# Patient Record
Sex: MALE | Race: BLACK OR AFRICAN AMERICAN | NOT HISPANIC OR LATINO | Employment: FULL TIME | URBAN - METROPOLITAN AREA
[De-identification: names, ages, dates, MRNs, and addresses within clinical notes are randomized per-mention and may not be internally consistent; named-entity substitution may affect disease eponyms.]

---

## 2017-03-14 ENCOUNTER — NON-PROVIDER VISIT (OUTPATIENT)
Dept: OCCUPATIONAL MEDICINE | Facility: CLINIC | Age: 62
End: 2017-03-14

## 2017-03-14 DIAGNOSIS — Z29.89 NEED FOR ISOLATION: ICD-10-CM

## 2017-03-14 PROCEDURE — 99202 OFFICE O/P NEW SF 15 MIN: CPT | Performed by: PREVENTIVE MEDICINE

## 2017-03-14 PROCEDURE — 94375 RESPIRATORY FLOW VOLUME LOOP: CPT | Performed by: PREVENTIVE MEDICINE

## 2017-03-14 NOTE — MR AVS SNAPSHOT
Jacinto Parker   3/14/2017 2:40 PM   Non-Provider Visit   MRN: [id number]    Department:  [facility]   Dept Phone:  [phone]    Description:  Male : 1955   Provider:  [facility] ESPERANZA NOGUEIRA, R.N.           Reason for Visit     Other Thermofisher Mask fit and Respiratory Clearance       Allergies as of 3/14/2017     Allergen Noted Reactions    Asa [Aspirin] 2011       Pcn [Penicillins] 2011         Vital Signs     Smoking Status                   Former Smoker           Basic Information     Date Of Birth Sex Race Ethnicity Preferred Language    1955 Male Black or  Non- English      Problem List              ICD-10-CM Priority Class Noted - Resolved    Non-ischemic cardiomyopathy (CMS-HCC) I42.9   2011 - Present    HTN (hypertension) I10   2011 - Present    Hypercholesteremia E78.00   2011 - Present      Health Maintenance        Date Due Completion Dates    IMM DTaP/Tdap/Td Vaccine (1 - Tdap) 1973 ---    IMM PNEUMOCOCCAL 19-64 (ADULT) MEDIUM RISK SERIES (1 of 1 - PPSV23) 1973 ---    COLONOSCOPY 2004 ---    IMM ZOSTER VACCINE 2014 ---    IMM INFLUENZA (1) 2016 ---            Current Immunizations     No immunizations on file.      Below and/or attached are the medications your provider expects you to take. Review all of your home medications and newly ordered medications with your provider and/or pharmacist. Follow medication instructions as directed by your provider and/or pharmacist. Please keep your medication list with you and share with your provider. Update the information when medications are discontinued, doses are changed, or new medications (including over-the-counter products) are added; and carry medication information at all times in the event of emergency situations     Allergies:  ASA - (reactions not documented)     PCN - (reactions not documented)               Medications  Valid as of: March  14, 2017 -  3:17 PM    Generic Name Brand Name Tablet Size Instructions for use    Carvedilol (Tab) COREG 25 MG TAKE ONE TABLET BY MOUTH EVERY DAY IN THE MORNING AND TWO TABLETS EVERY DAY IN THE EVENING        Enalapril Maleate (Tab) VASOTEC 10 MG Take 1 Tab by mouth 2 times a day.        Nitroglycerin (SL Tab) NITROSTAT 0.4 MG Place 1 Tab under tongue as needed for Chest Pain.        Simvastatin (Tab) ZOCOR 20 MG Take 1 Tab by mouth every evening.        Spironolactone (Tab) ALDACTONE 25 MG Take 1 Tab by mouth every day.        Tadalafil (Tab) CIALIS 20 MG Take 1 Tab by mouth as needed.        .                 Medicines prescribed today were sent to:     [facility] [address] [place] [address]    Phone: [phone] Fax: [phone]    Open 24 Hours?: No      Medication refill instructions:       If your prescription bottle indicates you have medication refills left, it is not necessary to call your provider’s office. Please contact your pharmacy and they will refill your medication.    If your prescription bottle indicates you do not have any refills left, you may request refills at any time through one of the following ways: The online [employer] system (except Urgent Care), by calling your provider’s office, or by asking your pharmacy to contact your provider’s office with a refill request. Medication refills are processed only during regular business hours and may not be available until the next business day. Your provider may request additional information or to have a follow-up visit with you prior to refilling your medication.   *Please Note: Medication refills are assigned a new Rx number when refilled electronically. Your pharmacy may indicate that no refills were authorized even though a new prescription for the same medication is available at the pharmacy. Please request the medicine by name with the pharmacy before contacting your provider  for a refill.           Procured Health Access Code: [email or web address]  Expires: 4/13/2017  3:17 PM    Procured Health  A secure, online tool to manage your health information     [employer]’s Procured Health® is a secure, online tool that connects you to your personalized health information from the privacy of your home -- day or night - making it very easy for you to manage your healthcare. Once the activation process is completed, you can even access your medical information using the Procured Health eri, which is available for free in the Apple Eri store or Google Play store.     Procured Health provides the following levels of access (as shown below):   My Chart Features   [facility] Primary Care Doctor [facility]  Specialists [facility]  Urgent  Care Non-[facility]  Primary Care  Doctor   Email your healthcare team securely and privately 24/7 X X X    Manage appointments: schedule your next appointment; view details of past/upcoming appointments X      Request prescription refills. X      View recent personal medical records, including lab and immunizations X X X X   View health record, including health history, allergies, medications X X X X   Read reports about your outpatient visits, procedures, consult and ER notes X X X X   See your discharge summary, which is a recap of your hospital and/or ER visit that includes your diagnosis, lab results, and care plan. X X       How to register for Procured Health:  1. Go to  [email or web address].  2. Click on the Sign Up Now box, which takes you to the New Member Sign Up page. You will need to provide the following information:  a. Enter your Procured Health Access Code exactly as it appears at the top of this page. (You will not need to use this code after you’ve completed the sign-up process. If you do not sign up before the expiration date, you must request a new code.)   b. Enter your date of birth.   c. Enter your home email address.   d. Click Submit, and follow the next screen’s instructions.  3. Create a Procured Health ID.  This will be your [employer] login ID and cannot be changed, so think of one that is secure and easy to remember.  4. Create a [employer] password. You can change your password at any time.  5. Enter your Password Reset Question and Answer. This can be used at a later time if you forget your password.   6. Enter your e-mail address. This allows you to receive e-mail notifications when new information is available in [employer].  7. Click Sign Up. You can now view your health information.    For assistance activating your [employer] account, call [phone]

## 2017-04-14 ENCOUNTER — NON-PROVIDER VISIT (OUTPATIENT)
Dept: OCCUPATIONAL MEDICINE | Facility: CLINIC | Age: 62
End: 2017-04-14

## 2017-04-14 DIAGNOSIS — Z01.89 RESPIRATORY CLEARANCE EXAMINATION, ENCOUNTER FOR: ICD-10-CM

## 2017-04-14 PROCEDURE — 99202 OFFICE O/P NEW SF 15 MIN: CPT | Performed by: PREVENTIVE MEDICINE

## 2017-04-14 NOTE — ADDENDUM NOTE
Addended by: ELDON PFEIFFER on: 4/14/2017 11:20 AM     Modules accepted: Level of Service, SmartSet

## 2017-04-14 NOTE — MR AVS SNAPSHOT
Jacinto Parker   2017 3:20 PM   Non-Provider Visit   MRN: [id number]    Department:  [facility]   Dept Phone:  [phone]    Description:  Male : 1955   Provider:  Roni Méndez D.O.           Reason for Visit     Other Resp clearance      Allergies as of 2017     Allergen Noted Reactions    Asa [Aspirin] 2011       Pcn [Penicillins] 2011         You were diagnosed with     Respiratory clearance examination, encounter for   [[id number]]         Vital Signs     Smoking Status                   Former Smoker           Basic Information     Date Of Birth Sex Race Ethnicity Preferred Language    1955 Male Black or  Non- English      Problem List              ICD-10-CM Priority Class Noted - Resolved    Non-ischemic cardiomyopathy (CMS-HCC) I42.9   2011 - Present    HTN (hypertension) I10   2011 - Present    Hypercholesteremia E78.00   2011 - Present      Health Maintenance        Date Due Completion Dates    IMM DTaP/Tdap/Td Vaccine (1 - Tdap) 1973 ---    IMM PNEUMOCOCCAL 19-64 (ADULT) MEDIUM RISK SERIES (1 of 1 - PPSV23) 1973 ---    COLONOSCOPY 2004 ---    IMM ZOSTER VACCINE 2014 ---            Current Immunizations     No immunizations on file.      Below and/or attached are the medications your provider expects you to take. Review all of your home medications and newly ordered medications with your provider and/or pharmacist. Follow medication instructions as directed by your provider and/or pharmacist. Please keep your medication list with you and share with your provider. Update the information when medications are discontinued, doses are changed, or new medications (including over-the-counter products) are added; and carry medication information at all times in the event of emergency situations     Allergies:  ASA - (reactions not documented)     PCN - (reactions not documented)               Medications   Valid as of: April 14, 2017 -  5:52 PM    Generic Name Brand Name Tablet Size Instructions for use    Carvedilol (Tab) COREG 25 MG TAKE ONE TABLET BY MOUTH EVERY DAY IN THE MORNING AND TWO TABLETS EVERY DAY IN THE EVENING        Enalapril Maleate (Tab) VASOTEC 10 MG Take 1 Tab by mouth 2 times a day.        Nitroglycerin (SL Tab) NITROSTAT 0.4 MG Place 1 Tab under tongue as needed for Chest Pain.        Simvastatin (Tab) ZOCOR 20 MG Take 1 Tab by mouth every evening.        Spironolactone (Tab) ALDACTONE 25 MG Take 1 Tab by mouth every day.        Tadalafil (Tab) CIALIS 20 MG Take 1 Tab by mouth as needed.        .                 Medicines prescribed today were sent to:     [facility] [address] [place] [address]    Phone: [phone] Fax: [phone]    Open 24 Hours?: No      Medication refill instructions:       If your prescription bottle indicates you have medication refills left, it is not necessary to call your provider’s office. Please contact your pharmacy and they will refill your medication.    If your prescription bottle indicates you do not have any refills left, you may request refills at any time through one of the following ways: The online [employer] system (except Urgent Care), by calling your provider’s office, or by asking your pharmacy to contact your provider’s office with a refill request. Medication refills are processed only during regular business hours and may not be available until the next business day. Your provider may request additional information or to have a follow-up visit with you prior to refilling your medication.   *Please Note: Medication refills are assigned a new Rx number when refilled electronically. Your pharmacy may indicate that no refills were authorized even though a new prescription for the same medication is available at the pharmacy. Please request the medicine by name with the pharmacy before  contacting your provider for a refill.           MapMyFitness Access Code: [email or web address]  Expires: 5/14/2017  5:52 PM    MapMyFitness  A secure, online tool to manage your health information     [employer]’s MapMyFitness® is a secure, online tool that connects you to your personalized health information from the privacy of your home -- day or night - making it very easy for you to manage your healthcare. Once the activation process is completed, you can even access your medical information using the MapMyFitness eri, which is available for free in the Apple Eri store or Google Play store.     MapMyFitness provides the following levels of access (as shown below):   My Chart Features   [facility] Primary Care Doctor [facility]  Specialists [facility]  Urgent  Care Non-[facility]  Primary Care  Doctor   Email your healthcare team securely and privately 24/7 X X X    Manage appointments: schedule your next appointment; view details of past/upcoming appointments X      Request prescription refills. X      View recent personal medical records, including lab and immunizations X X X X   View health record, including health history, allergies, medications X X X X   Read reports about your outpatient visits, procedures, consult and ER notes X X X X   See your discharge summary, which is a recap of your hospital and/or ER visit that includes your diagnosis, lab results, and care plan. X X       How to register for MapMyFitness:  1. Go to  [email or web address].  2. Click on the Sign Up Now box, which takes you to the New Member Sign Up page. You will need to provide the following information:  a. Enter your MapMyFitness Access Code exactly as it appears at the top of this page. (You will not need to use this code after you’ve completed the sign-up process. If you do not sign up before the expiration date, you must request a new code.)   b. Enter your date of birth.   c. Enter your home email address.   d. Click Submit, and follow the next screen’s  instructions.  3. Create a P[employer] ID. This will be your [employer] login ID and cannot be changed, so think of one that is secure and easy to remember.  4. Create a [employer] password. You can change your password at any time.  5. Enter your Password Reset Question and Answer. This can be used at a later time if you forget your password.   6. Enter your e-mail address. This allows you to receive e-mail notifications when new information is available in [employer].  7. Click Sign Up. You can now view your health information.    For assistance activating your [employer] account, call [phone]

## 2017-07-17 ENCOUNTER — HOSPITAL ENCOUNTER (OUTPATIENT)
Dept: HOSPITAL 8 - CFH | Age: 62
Discharge: HOME | End: 2017-07-17
Attending: NURSE PRACTITIONER
Payer: COMMERCIAL

## 2017-07-17 DIAGNOSIS — I25.9: Primary | ICD-10-CM

## 2017-07-17 DIAGNOSIS — I42.9: ICD-10-CM

## 2017-07-17 PROCEDURE — A9502 TC99M TETROFOSMIN: HCPCS

## 2017-07-17 PROCEDURE — 78452 HT MUSCLE IMAGE SPECT MULT: CPT

## 2017-07-17 PROCEDURE — 93017 CV STRESS TEST TRACING ONLY: CPT

## 2017-08-04 VITALS — DIASTOLIC BLOOD PRESSURE: 72 MMHG | SYSTOLIC BLOOD PRESSURE: 107 MMHG

## 2017-08-04 LAB
AST SERPL-CCNC: 16 U/L (ref 15–37)
BUN SERPL-MCNC: 15 MG/DL (ref 7–18)
HCT VFR BLD CALC: 45.6 % (ref 39.2–51.8)
HGB BLD-MCNC: 14.9 G/DL (ref 13.7–18)
WBC # BLD AUTO: 5.2 X10^3/UL (ref 3.4–10)

## 2017-08-07 ENCOUNTER — HOSPITAL ENCOUNTER (OUTPATIENT)
Dept: HOSPITAL 8 - CACL | Age: 62
End: 2017-08-07
Attending: INTERNAL MEDICINE
Payer: COMMERCIAL

## 2017-08-07 VITALS — WEIGHT: 185.19 LBS | BODY MASS INDEX: 28.07 KG/M2 | HEIGHT: 68 IN

## 2017-08-07 DIAGNOSIS — Z79.899: ICD-10-CM

## 2017-08-07 DIAGNOSIS — I42.8: Primary | ICD-10-CM

## 2017-08-07 DIAGNOSIS — E78.00: ICD-10-CM

## 2017-08-07 DIAGNOSIS — J44.9: ICD-10-CM

## 2017-08-07 DIAGNOSIS — I11.0: ICD-10-CM

## 2017-08-07 DIAGNOSIS — E11.9: ICD-10-CM

## 2017-08-07 DIAGNOSIS — F17.211: ICD-10-CM

## 2017-08-07 DIAGNOSIS — I50.9: ICD-10-CM

## 2017-08-07 DIAGNOSIS — Z88.0: ICD-10-CM

## 2017-08-07 PROCEDURE — C1894 INTRO/SHEATH, NON-LASER: HCPCS

## 2017-08-07 PROCEDURE — 85610 PROTHROMBIN TIME: CPT

## 2017-08-07 PROCEDURE — 71020: CPT

## 2017-08-07 PROCEDURE — 99156 MOD SED OTH PHYS/QHP 5/>YRS: CPT

## 2017-08-07 PROCEDURE — 36415 COLL VENOUS BLD VENIPUNCTURE: CPT

## 2017-08-07 PROCEDURE — 85025 COMPLETE CBC W/AUTO DIFF WBC: CPT

## 2017-08-07 PROCEDURE — 93458 L HRT ARTERY/VENTRICLE ANGIO: CPT

## 2017-08-07 PROCEDURE — 99157 MOD SED OTHER PHYS/QHP EA: CPT

## 2017-08-07 PROCEDURE — 80053 COMPREHEN METABOLIC PANEL: CPT

## 2017-08-07 PROCEDURE — 85730 THROMBOPLASTIN TIME PARTIAL: CPT

## 2017-09-15 ENCOUNTER — NON-PROVIDER VISIT (OUTPATIENT)
Dept: OCCUPATIONAL MEDICINE | Facility: CLINIC | Age: 62
End: 2017-09-15

## 2017-09-15 DIAGNOSIS — Z11.1 ENCOUNTER FOR PPD TEST: Primary | ICD-10-CM

## 2017-09-17 ENCOUNTER — NON-PROVIDER VISIT (OUTPATIENT)
Dept: URGENT CARE | Facility: CLINIC | Age: 62
End: 2017-09-17

## 2017-09-17 ENCOUNTER — APPOINTMENT (OUTPATIENT)
Dept: RADIOLOGY | Facility: IMAGING CENTER | Age: 62
End: 2017-09-17
Attending: EMERGENCY MEDICINE
Payer: COMMERCIAL

## 2017-09-17 DIAGNOSIS — R76.11 POSITIVE PPD: ICD-10-CM

## 2017-09-17 LAB — TB WHEAL 3D P 5 TU DIAM: NORMAL MM

## 2017-09-17 PROCEDURE — 71010 DX-CHEST-LIMITED (1 VIEW): CPT | Mod: TC | Performed by: EMERGENCY MEDICINE

## 2017-09-28 PROCEDURE — 86580 TB INTRADERMAL TEST: CPT | Performed by: NURSE PRACTITIONER

## 2019-03-04 ENCOUNTER — HOSPITAL ENCOUNTER (OUTPATIENT)
Dept: HOSPITAL 8 - CFH | Age: 64
Discharge: HOME | End: 2019-03-04
Attending: PHYSICIAN ASSISTANT
Payer: MEDICARE

## 2019-03-04 DIAGNOSIS — I25.2: ICD-10-CM

## 2019-03-04 DIAGNOSIS — E78.5: ICD-10-CM

## 2019-03-04 DIAGNOSIS — I08.0: Primary | ICD-10-CM

## 2019-03-04 DIAGNOSIS — Z87.891: ICD-10-CM

## 2019-03-04 DIAGNOSIS — I10: ICD-10-CM

## 2019-03-04 PROCEDURE — 93306 TTE W/DOPPLER COMPLETE: CPT

## 2019-08-02 ENCOUNTER — NON-PROVIDER VISIT (OUTPATIENT)
Dept: OCCUPATIONAL MEDICINE | Facility: CLINIC | Age: 64
End: 2019-08-02

## 2019-08-02 DIAGNOSIS — Z11.1 ENCOUNTER FOR PPD TEST: Primary | ICD-10-CM

## 2019-08-02 PROCEDURE — 86580 TB INTRADERMAL TEST: CPT | Performed by: NURSE PRACTITIONER

## 2019-08-04 ENCOUNTER — NON-PROVIDER VISIT (OUTPATIENT)
Dept: URGENT CARE | Facility: CLINIC | Age: 64
End: 2019-08-04

## 2019-08-04 ENCOUNTER — APPOINTMENT (OUTPATIENT)
Dept: RADIOLOGY | Facility: IMAGING CENTER | Age: 64
End: 2019-08-04
Attending: PHYSICIAN ASSISTANT

## 2019-08-04 DIAGNOSIS — R76.11 POSITIVE PPD: ICD-10-CM

## 2019-08-04 LAB — TB WHEAL 3D P 5 TU DIAM: NORMAL MM

## 2019-08-04 PROCEDURE — 71045 X-RAY EXAM CHEST 1 VIEW: CPT | Mod: TC | Performed by: PHYSICIAN ASSISTANT

## 2021-03-03 DIAGNOSIS — Z23 NEED FOR VACCINATION: ICD-10-CM
